# Patient Record
Sex: MALE | Race: WHITE | NOT HISPANIC OR LATINO | URBAN - METROPOLITAN AREA
[De-identification: names, ages, dates, MRNs, and addresses within clinical notes are randomized per-mention and may not be internally consistent; named-entity substitution may affect disease eponyms.]

---
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
## 2023-02-06 ENCOUNTER — OFFICE VISIT (OUTPATIENT)
Dept: URGENT CARE | Facility: CLINIC | Age: 18
End: 2023-02-06

## 2023-02-06 VITALS
OXYGEN SATURATION: 98 % | TEMPERATURE: 98.7 F | SYSTOLIC BLOOD PRESSURE: 139 MMHG | WEIGHT: 197 LBS | DIASTOLIC BLOOD PRESSURE: 69 MMHG | HEIGHT: 72 IN | RESPIRATION RATE: 15 BRPM | HEART RATE: 103 BPM | BODY MASS INDEX: 26.68 KG/M2

## 2023-02-06 DIAGNOSIS — H60.502 ACUTE OTITIS EXTERNA OF LEFT EAR, UNSPECIFIED TYPE: ICD-10-CM

## 2023-02-06 DIAGNOSIS — H66.002 NON-RECURRENT ACUTE SUPPURATIVE OTITIS MEDIA OF LEFT EAR WITHOUT SPONTANEOUS RUPTURE OF TYMPANIC MEMBRANE: Primary | ICD-10-CM

## 2023-02-06 RX ORDER — AMOXICILLIN 875 MG/1
875 TABLET, COATED ORAL 2 TIMES DAILY
Qty: 14 TABLET | Refills: 0 | Status: SHIPPED | OUTPATIENT
Start: 2023-02-06 | End: 2023-02-13

## 2023-02-06 RX ORDER — CIPROFLOXACIN AND DEXAMETHASONE 3; 1 MG/ML; MG/ML
4 SUSPENSION/ DROPS AURICULAR (OTIC) 2 TIMES DAILY
Qty: 7.5 ML | Refills: 0 | Status: SHIPPED | OUTPATIENT
Start: 2023-02-06

## 2023-02-06 NOTE — PATIENT INSTRUCTIONS
--Take oral antibiotic and instill antibiotic ear drops as prescribed x 1 week  --Motrin as needed for pain/fever  --OTC decongestant (Sudafed) as needed  --OTC nasal steroid (Flonase, Nasocort) 2 sprays/nostril at bedtime  --Follow-up with PCP if no improvement/worsening over the next 48-72 hours

## 2023-02-06 NOTE — LETTER
February 6, 2023     Patient: Rich Pruitt   YOB: 2005   Date of Visit: 2/6/2023       To Whom it May Concern:    Rich Pruitt was seen in my clinic on 2/6/2023  Please excuse from school/work today due to medical condition, doctor's visit  If you have any questions or concerns, please don't hesitate to call           Sincerely,          AMILCAR RUEDA        CC: No Recipients

## 2023-02-06 NOTE — PROGRESS NOTES
[facility]        NAME: Naga Gary is a 21 y o  male  : 2005    MRN: [id number]  DATE: 2023  TIME: 1:37 PM    Assessment and Plan   Non-recurrent acute suppurative otitis media of left ear without spontaneous rupture of tympanic membrane [H66 002]  1  Non-recurrent acute suppurative otitis media of left ear without spontaneous rupture of tympanic membrane  amoxicillin (AMOXIL) 875 mg tablet      2  Acute otitis externa of left ear, unspecified type  ciprofloxacin-dexamethasone (CIPRODEX) otic suspension            Patient Instructions     --Take oral antibiotic and instill antibiotic ear drops as prescribed x 1 week  --Motrin as needed for pain/fever  --OTC decongestant (Sudafed) as needed  --OTC nasal steroid (Flonase, Nasocort) 2 sprays/nostril at bedtime  --Follow-up with PCP if no improvement/worsening over the next 48-72 hours  Chief Complaint     Chief Complaint   Patient presents with   • Earache     Pt reports ear pain/headache/cough  Pt c/o left ear pain since Sat  History of Present Illness         Here with mom for complaints of left ear pain x 2 days  Some nasal congestion, rhinorrhea, cough for a couple days also  No fever, sore throat, chills, headache, body aches  No abdominal pain, N/V/D  No otorrhea  Taking Motrin  Review of Systems   Review of Systems   Constitutional: Negative for fever  HENT: Positive for ear pain and rhinorrhea  Negative for sore throat  Respiratory: Positive for cough  Gastrointestinal: Negative for vomiting  Musculoskeletal: Negative for myalgias  Neurological: Negative for headaches           Current Medications       Current Outpatient Medications:   •  amoxicillin (AMOXIL) 875 mg tablet, Take 1 tablet (875 mg total) by mouth 2 (two) times a day for 7 days, Disp: 14 tablet, Rfl: 0  •  ciprofloxacin-dexamethasone (CIPRODEX) otic suspension, Administer 4 drops into the left ear 2 (two) times a day, Disp: 7 5 mL, Rfl: 0  •  mupirocin (BACTROBAN) 2 % ointment, Apply topically 3 (three) times a day Apply to toe lesion 3 times daily until healed  (Patient not taking: Reported on 10/4/2022), Disp: 22 g, Rfl: 0    Current Allergies     Allergies as of 02/06/2023   • (No Known Allergies)            The following portions of the patient's history were reviewed and updated as appropriate: allergies, current medications, past family history, past medical history, past social history, past surgical history and problem list      History reviewed  No pertinent past medical history  History reviewed  No pertinent surgical history  No family history on file  Medications have been verified  Objective   /69   Pulse 103   Temp 98 7 °F (37 1 °C)   Resp 15   Ht 5' 11 5" (1 816 m)   Wt 89 4 kg (197 lb)   SpO2 98%   BMI 27 09 kg/m²   No LMP for male patient  Physical Exam     Physical Exam  Constitutional:       General: He is not in acute distress  Appearance: He is well-developed  He is not diaphoretic  HENT:      Head: Normocephalic and atraumatic  Right Ear: Tympanic membrane, ear canal and external ear normal       Left Ear: External ear normal       Ears:      Comments: Left TM erythematous, bulging, intact  Distal half of canal erythematous, mildly swollen, without otorrhea  Nose: Congestion and rhinorrhea present  Mouth/Throat:      Pharynx: No oropharyngeal exudate or posterior oropharyngeal erythema  Eyes:      General:         Right eye: No discharge  Left eye: No discharge  Conjunctiva/sclera: Conjunctivae normal    Cardiovascular:      Rate and Rhythm: Normal rate and regular rhythm  Heart sounds: Normal heart sounds  Pulmonary:      Effort: Pulmonary effort is normal  No respiratory distress  Breath sounds: Normal breath sounds  No stridor  No wheezing, rhonchi or rales  Chest:      Chest wall: No tenderness     Abdominal: Palpations: Abdomen is soft  Tenderness: There is no abdominal tenderness  Musculoskeletal:      Cervical back: Neck supple  No tenderness  Lymphadenopathy:      Cervical: No cervical adenopathy  Skin:     General: Skin is warm and dry  Findings: No rash  Neurological:      Mental Status: He is alert and oriented to person, place, and time     Psychiatric:         Mood and Affect: Mood normal

## 2023-09-16 ENCOUNTER — OFFICE VISIT (OUTPATIENT)
Dept: URGENT CARE | Facility: MEDICAL CENTER | Age: 18
End: 2023-09-16
Payer: COMMERCIAL

## 2023-09-16 VITALS
WEIGHT: 197 LBS | OXYGEN SATURATION: 99 % | RESPIRATION RATE: 18 BRPM | TEMPERATURE: 98.2 F | HEART RATE: 83 BPM | SYSTOLIC BLOOD PRESSURE: 157 MMHG | HEIGHT: 72 IN | BODY MASS INDEX: 26.68 KG/M2 | DIASTOLIC BLOOD PRESSURE: 83 MMHG

## 2023-09-16 DIAGNOSIS — B35.4 TINEA CORPORIS: Primary | ICD-10-CM

## 2023-09-16 PROCEDURE — 99213 OFFICE O/P EST LOW 20 MIN: CPT | Performed by: PHYSICIAN ASSISTANT

## 2023-09-16 RX ORDER — NYSTATIN AND TRIAMCINOLONE ACETONIDE 100000; 1 [USP'U]/G; MG/G
OINTMENT TOPICAL 2 TIMES DAILY
Qty: 30 G | Refills: 1 | Status: SHIPPED | OUTPATIENT
Start: 2023-09-16

## 2023-09-16 NOTE — PROGRESS NOTES
[facility] Now        NAME: Chong Agarwal is a 19 y.o. male  : 2005    MRN: [id number]  DATE: 2023  TIME: 8:03 PM    Assessment and Plan   Tinea corporis [B35.4]  1. Tinea corporis  nystatin-triamcinolone (MYCOLOG-II) ointment    Ambulatory Referral to Dermatology            Patient Instructions     1. Nystatin with triamcinolone as prescribed. 2.  Go to the ER for any significantly worsening symptoms. 3.  Follow-up with dermatology as soon as possible, especially if the rash does not resolve with the current recommended treatment. Chief Complaint     Chief Complaint   Patient presents with   • Rash     Pt having red ringshaped like rash x2 months. History of Present Illness       49-year-old male patient with a 2 to 3-month history of slightly worsening bilateral upper extremity rash which is slightly itchy not painful. No discharge or ooze. Patient states that they believed the rash is ringworm based on its characteristics and have been using over-the-counter clotrimazole and cream without improvement. Rash  Pertinent negatives include no cough, fever, shortness of breath, sore throat or vomiting. Review of Systems   Review of Systems   Constitutional: Negative for chills and fever. HENT: Negative for ear pain and sore throat. Eyes: Negative for pain and visual disturbance. Respiratory: Negative for cough and shortness of breath. Cardiovascular: Negative for chest pain and palpitations. Gastrointestinal: Negative for abdominal pain and vomiting. Genitourinary: Negative for dysuria and hematuria. Musculoskeletal: Negative for arthralgias and back pain. Skin: Positive for rash. Negative for color change. Neurological: Negative for seizures and syncope. All other systems reviewed and are negative.         Current Medications       Current Outpatient Medications:   •  nystatin-triamcinolone (MYCOLOG-II) ointment, Apply topically 2 (two) times a day Apply to upper extremity rash twice daily for 2 weeks or until resolved., Disp: 30 g, Rfl: 1  •  ciprofloxacin-dexamethasone (CIPRODEX) otic suspension, Administer 4 drops into the left ear 2 (two) times a day (Patient not taking: Reported on 9/16/2023), Disp: 7.5 mL, Rfl: 0  •  mupirocin (BACTROBAN) 2 % ointment, Apply topically 3 (three) times a day Apply to toe lesion 3 times daily until healed. (Patient not taking: Reported on 10/4/2022), Disp: 22 g, Rfl: 0    Current Allergies     Allergies as of 09/16/2023   • (No Known Allergies)            The following portions of the patient's history were reviewed and updated as appropriate: allergies, current medications, past family history, past medical history, past social history, past surgical history and problem list.     History reviewed. No pertinent past medical history. History reviewed. No pertinent surgical history. No family history on file. Medications have been verified. Objective   /83   Pulse 83   Temp 98.2 °F (36.8 °C) (Temporal)   Resp 18   Ht 5' 11.5" (1.816 m)   Wt 89.4 kg (197 lb)   SpO2 99%   BMI 27.09 kg/m²        Physical Exam     Physical Exam  Vitals and nursing note reviewed. Constitutional:       Appearance: Normal appearance. HENT:      Head: Normocephalic. Nose: Nose normal.      Mouth/Throat:      Mouth: Mucous membranes are moist.      Pharynx: Oropharynx is clear. Eyes:      Conjunctiva/sclera: Conjunctivae normal.      Pupils: Pupils are equal, round, and reactive to light. Cardiovascular:      Rate and Rhythm: Normal rate and regular rhythm. Pulses: Normal pulses. Pulmonary:      Effort: Pulmonary effort is normal.      Breath sounds: Normal breath sounds. Musculoskeletal:         General: Normal range of motion. Cervical back: Normal range of motion and neck supple. Skin:     General: Skin is warm and dry. Capillary Refill: Capillary refill takes less than 2 seconds. Comments: Several patches of rash noted to the distal bilateral upper extremities which are several centimeters in diameter, red, raised, round, well demarcated, slightly scaly. Neurological:      Mental Status: He is alert and oriented to person, place, and time. Psychiatric:         Mood and Affect: Mood normal.         Behavior: Behavior normal.       Medical decision making note:  Based on the rash characteristics I suspect that tinea corporis is at the top of the differential; however, after 2 to 3 months of Mormon use of clotrimazole I would expect that it would be resolved. Other entities have to be considered including granuloma annularae. We will have patient trial nystatin with triamcinolone for 2 weeks and if not improved/resolved we will have the patient follow-up with dermatology.

## 2023-09-17 NOTE — PATIENT INSTRUCTIONS
1.  Nystatin with triamcinolone as prescribed. 2.  Go to the ER for any significantly worsening symptoms. 3.  Follow-up with dermatology as soon as possible, especially if the rash does not resolve with the current recommended treatment.

## 2024-02-09 ENCOUNTER — OFFICE VISIT (OUTPATIENT)
Dept: URGENT CARE | Facility: CLINIC | Age: 19
End: 2024-02-09
Payer: COMMERCIAL

## 2024-02-09 VITALS
HEART RATE: 89 BPM | TEMPERATURE: 96.4 F | WEIGHT: 205 LBS | BODY MASS INDEX: 28.7 KG/M2 | HEIGHT: 71 IN | DIASTOLIC BLOOD PRESSURE: 91 MMHG | OXYGEN SATURATION: 99 % | RESPIRATION RATE: 14 BRPM | SYSTOLIC BLOOD PRESSURE: 121 MMHG

## 2024-02-09 DIAGNOSIS — H61.22 IMPACTED CERUMEN OF LEFT EAR: ICD-10-CM

## 2024-02-09 DIAGNOSIS — J06.9 ACUTE URI: Primary | ICD-10-CM

## 2024-02-09 PROCEDURE — 99213 OFFICE O/P EST LOW 20 MIN: CPT | Performed by: NURSE PRACTITIONER

## 2024-02-09 PROCEDURE — 69209 REMOVE IMPACTED EAR WAX UNI: CPT | Performed by: NURSE PRACTITIONER

## 2024-02-09 NOTE — PROGRESS NOTES
[facility]        NAME: Fer Syed is a 18 y.o. male  : 2005    MRN: [id number]  DATE: 2024  TIME: 10:47 AM    Assessment and Plan   Acute URI [J06.9]  1. Acute URI        2. Impacted cerumen of left ear              Patient Instructions     --Rest, drink plenty of fluids  --Dry ear canal as advised  --For cough, you can take an OTC expectorant such as plain Robitussion or Mucinex (active ingredient guaifenesin).  A spoonful of honey at bedtime may also be helpful, as may a prescription cough medicine.  Also recommended is the use of a cool mist humidifier (with or without Vicks) in the bedroom at night.   --For sore throat, you can take OTC lozenges, use warm gargles (salt water or apple cider vinegar and honey), herbal teas, or an OTC throat spray (Chloraseptic).  --For nasal/sinus congestion, helpful measures include steam, warm compresses, an OTC saline nasal spray or Neti pot, or an OTC decongestant (such as Sudafed).  The decongestant should be avoided, however, if you are under 6 years of age, or have a history of high blood pressure or heart disease.  In addition, an OTC nasal steroid (Flonase, Nasocort) or nasal decongestant (Afrin, Francisco-synephrine) may be taken.  The nasal steroid should be used at bedtime, after the saline nasal spray. The nasal decongestant should not be taken more than 3 days consecutively in order to prevent rebound congestion.   --For nasal drainage, postnasal drip, sneezing and itching, an OTC antihistamine (Allegra, Benadryl, etc) can be taken.   --You can take Tylenol or Motrin/Advil as needed for fever, headache, body aches. Motrin/Advil should be avoided, however, if you have a history of heart disease, bleeding ulcers, or if you take blood thinners.   --You should contact your primary care provider and/or go to the ER if your symptoms are not improved or get worse over the next 7 days.  This includes new onset fever, localized ear pain, sinus pain, as  well as worsening cough, chest pain, shortness of breath, or significant weakness/fatigue.      Chief Complaint     Chief Complaint   Patient presents with    Earache     Bilateral ear pain started yesterday.          History of Present Illness       Here with mom for complaint of L>R sided ear discomfort, decreased hearing x 2 days.    Some runny nose, mild congestion also.  Scratchy throat.    No cough.    No fever. No headache, body aches.    No otorrhea.    No OTC meds taken.           Review of Systems   Review of Systems   Constitutional:  Negative for fever.   HENT:  Positive for ear pain, rhinorrhea and sore throat. Negative for ear discharge.    Respiratory:  Negative for cough.    Neurological:  Negative for headaches.         Current Medications       Current Outpatient Medications:     ciprofloxacin-dexamethasone (CIPRODEX) otic suspension, Administer 4 drops into the left ear 2 (two) times a day (Patient not taking: Reported on 9/16/2023), Disp: 7.5 mL, Rfl: 0    mupirocin (BACTROBAN) 2 % ointment, Apply topically 3 (three) times a day Apply to toe lesion 3 times daily until healed. (Patient not taking: Reported on 10/4/2022), Disp: 22 g, Rfl: 0    nystatin-triamcinolone (MYCOLOG-II) ointment, Apply topically 2 (two) times a day Apply to upper extremity rash twice daily for 2 weeks or until resolved., Disp: 30 g, Rfl: 1    Current Allergies     Allergies as of 02/09/2024    (No Known Allergies)            The following portions of the patient's history were reviewed and updated as appropriate: allergies, current medications, past family history, past medical history, past social history, past surgical history and problem list.     History reviewed. No pertinent past medical history.    History reviewed. No pertinent surgical history.    History reviewed. No pertinent family history.      Medications have been verified.        Objective   /91   Pulse 89   Temp (!) 96.4 °F (35.8 °C)   Resp 14    "Ht 5' 11\" (1.803 m)   Wt 93 kg (205 lb)   SpO2 99%   BMI 28.59 kg/m²   No LMP for male patient.       Physical Exam     Physical Exam  Constitutional:       General: He is not in acute distress.     Appearance: He is well-developed. He is not diaphoretic.   HENT:      Head: Normocephalic and atraumatic.      Right Ear: External ear normal.      Left Ear: External ear normal. There is impacted cerumen.      Ears:      Comments: Left canal 100% occluded with cerumen.  S/p removal canal WNL, TM dull grey/pink with minimal bulge.    Right TM dull grey with minimal bulge.         Nose: Nose normal.      Mouth/Throat:      Pharynx: No oropharyngeal exudate.   Eyes:      Conjunctiva/sclera: Conjunctivae normal.      Pupils: Pupils are equal, round, and reactive to light.   Neck:      Thyroid: No thyromegaly.   Cardiovascular:      Rate and Rhythm: Normal rate and regular rhythm.      Heart sounds: Normal heart sounds.   Pulmonary:      Effort: Pulmonary effort is normal.      Breath sounds: Normal breath sounds.   Abdominal:      General: Bowel sounds are normal.      Palpations: Abdomen is soft.      Tenderness: There is no abdominal tenderness.   Musculoskeletal:      Cervical back: Neck supple.   Lymphadenopathy:      Cervical: No cervical adenopathy.   Skin:     General: Skin is warm and dry.   Neurological:      Mental Status: He is alert and oriented to person, place, and time.      Deep Tendon Reflexes: Reflexes are normal and symmetric.       Ear cerumen removal    Date/Time: 2/9/2024 10:00 AM    Performed by: SON Varghese  Authorized by: SON Varghese  Universal Protocol:  Consent: Verbal consent obtained.  Risks and benefits: risks, benefits and alternatives were discussed  Consent given by: patient  Patient understanding: patient states understanding of the procedure being performed  Patient consent: the patient's understanding of the procedure matches consent given  Procedure consent: procedure " consent matches procedure scheduled  Required items: required blood products, implants, devices, and special equipment available  Patient identity confirmed: verbally with patient    Patient location:  Clinic  Indications / Diagnosis:  Discomfort, decreased hearing  Procedure details:     Location:  L ear    Procedure type: irrigation only      Approach:  Natural orifice    Visualization (free text):  SEE EXAM  Post-procedure details:     Complication:  None    Hearing quality:  Improved    Patient tolerance of procedure:  Tolerated well, no immediate complications

## 2024-02-09 NOTE — PATIENT INSTRUCTIONS
--Rest, drink plenty of fluids  --Dry ear canal as advised  --For cough, you can take an OTC expectorant such as plain Robitussion or Mucinex (active ingredient guaifenesin).  A spoonful of honey at bedtime may also be helpful, as may a prescription cough medicine.  Also recommended is the use of a cool mist humidifier (with or without Vicks) in the bedroom at night.   --For sore throat, you can take OTC lozenges, use warm gargles (salt water or apple cider vinegar and honey), herbal teas, or an OTC throat spray (Chloraseptic).  --For nasal/sinus congestion, helpful measures include steam, warm compresses, an OTC saline nasal spray or Neti pot, or an OTC decongestant (such as Sudafed).  The decongestant should be avoided, however, if you are under 6 years of age, or have a history of high blood pressure or heart disease.  In addition, an OTC nasal steroid (Flonase, Nasocort) or nasal decongestant (Afrin, Francisco-synephrine) may be taken.  The nasal steroid should be used at bedtime, after the saline nasal spray. The nasal decongestant should not be taken more than 3 days consecutively in order to prevent rebound congestion.   --For nasal drainage, postnasal drip, sneezing and itching, an OTC antihistamine (Allegra, Benadryl, etc) can be taken.   --You can take Tylenol or Motrin/Advil as needed for fever, headache, body aches. Motrin/Advil should be avoided, however, if you have a history of heart disease, bleeding ulcers, or if you take blood thinners.   --You should contact your primary care provider and/or go to the ER if your symptoms are not improved or get worse over the next 7 days.  This includes new onset fever, localized ear pain, sinus pain, as well as worsening cough, chest pain, shortness of breath, or significant weakness/fatigue.